# Patient Record
Sex: MALE | Race: BLACK OR AFRICAN AMERICAN | Employment: UNEMPLOYED | URBAN - METROPOLITAN AREA
[De-identification: names, ages, dates, MRNs, and addresses within clinical notes are randomized per-mention and may not be internally consistent; named-entity substitution may affect disease eponyms.]

---

## 2023-09-09 ENCOUNTER — HOSPITAL ENCOUNTER (INPATIENT)
Facility: HOSPITAL | Age: 23
LOS: 2 days | Discharge: HOME | DRG: 751 | End: 2023-09-11
Attending: STUDENT IN AN ORGANIZED HEALTH CARE EDUCATION/TRAINING PROGRAM | Admitting: PSYCHIATRY & NEUROLOGY
Payer: MEDICAID

## 2023-09-09 DIAGNOSIS — F33.2 SEVERE EPISODE OF RECURRENT MAJOR DEPRESSIVE DISORDER, WITHOUT PSYCHOTIC FEATURES (HCC): Primary | ICD-10-CM

## 2023-09-09 DIAGNOSIS — R45.851 SUICIDAL IDEATION: ICD-10-CM

## 2023-09-09 PROBLEM — F32.A DEPRESSION: Status: ACTIVE | Noted: 2023-09-09

## 2023-09-09 LAB
ALBUMIN SERPL-MCNC: 4 G/DL (ref 3.4–5)
ALBUMIN/GLOB SERPL: 1 (ref 0.8–1.7)
ALP SERPL-CCNC: 62 U/L (ref 45–117)
ALT SERPL-CCNC: 103 U/L (ref 16–61)
AMPHET UR QL SCN: NEGATIVE
ANION GAP SERPL CALC-SCNC: 6 MMOL/L (ref 3–18)
AST SERPL-CCNC: 50 U/L (ref 10–38)
BARBITURATES UR QL SCN: NEGATIVE
BASOPHILS # BLD: 0 K/UL (ref 0–0.1)
BASOPHILS NFR BLD: 0 % (ref 0–2)
BENZODIAZ UR QL: NEGATIVE
BILIRUB SERPL-MCNC: 0.6 MG/DL (ref 0.2–1)
BUN SERPL-MCNC: 11 MG/DL (ref 7–18)
BUN/CREAT SERPL: 11 (ref 12–20)
CALCIUM SERPL-MCNC: 9.3 MG/DL (ref 8.5–10.1)
CANNABINOIDS UR QL SCN: POSITIVE
CHLORIDE SERPL-SCNC: 108 MMOL/L (ref 100–111)
CO2 SERPL-SCNC: 27 MMOL/L (ref 21–32)
COCAINE UR QL SCN: NEGATIVE
CREAT SERPL-MCNC: 0.98 MG/DL (ref 0.6–1.3)
DIFFERENTIAL METHOD BLD: ABNORMAL
EOSINOPHIL # BLD: 0 K/UL (ref 0–0.4)
EOSINOPHIL NFR BLD: 1 % (ref 0–5)
ERYTHROCYTE [DISTWIDTH] IN BLOOD BY AUTOMATED COUNT: 14.6 % (ref 11.6–14.5)
ETHANOL SERPL-MCNC: <3 MG/DL (ref 0–3)
GLOBULIN SER CALC-MCNC: 3.9 G/DL (ref 2–4)
GLUCOSE SERPL-MCNC: 92 MG/DL (ref 74–99)
HCT VFR BLD AUTO: 44.8 % (ref 36–48)
HGB BLD-MCNC: 14.6 G/DL (ref 13–16)
IMM GRANULOCYTES # BLD AUTO: 0 K/UL (ref 0–0.04)
IMM GRANULOCYTES NFR BLD AUTO: 0 % (ref 0–0.5)
LYMPHOCYTES # BLD: 2.7 K/UL (ref 0.9–3.6)
LYMPHOCYTES NFR BLD: 32 % (ref 21–52)
Lab: ABNORMAL
MCH RBC QN AUTO: 29.2 PG (ref 24–34)
MCHC RBC AUTO-ENTMCNC: 32.6 G/DL (ref 31–37)
MCV RBC AUTO: 89.6 FL (ref 78–100)
METHADONE UR QL: NEGATIVE
MONOCYTES # BLD: 0.6 K/UL (ref 0.05–1.2)
MONOCYTES NFR BLD: 7 % (ref 3–10)
NEUTS SEG # BLD: 5.2 K/UL (ref 1.8–8)
NEUTS SEG NFR BLD: 60 % (ref 40–73)
NRBC # BLD: 0 K/UL (ref 0–0.01)
NRBC BLD-RTO: 0 PER 100 WBC
OPIATES UR QL: NEGATIVE
PCP UR QL: NEGATIVE
PLATELET # BLD AUTO: 194 K/UL (ref 135–420)
PMV BLD AUTO: 12 FL (ref 9.2–11.8)
POTASSIUM SERPL-SCNC: 3.6 MMOL/L (ref 3.5–5.5)
PROT SERPL-MCNC: 7.9 G/DL (ref 6.4–8.2)
RBC # BLD AUTO: 5 M/UL (ref 4.35–5.65)
SODIUM SERPL-SCNC: 141 MMOL/L (ref 136–145)
WBC # BLD AUTO: 8.5 K/UL (ref 4.6–13.2)

## 2023-09-09 PROCEDURE — 6370000000 HC RX 637 (ALT 250 FOR IP): Performed by: EMERGENCY MEDICINE

## 2023-09-09 PROCEDURE — 80053 COMPREHEN METABOLIC PANEL: CPT

## 2023-09-09 PROCEDURE — 82077 ASSAY SPEC XCP UR&BREATH IA: CPT

## 2023-09-09 PROCEDURE — 99285 EMERGENCY DEPT VISIT HI MDM: CPT

## 2023-09-09 PROCEDURE — 1240000000 HC EMOTIONAL WELLNESS R&B

## 2023-09-09 PROCEDURE — 80307 DRUG TEST PRSMV CHEM ANLYZR: CPT

## 2023-09-09 PROCEDURE — 85025 COMPLETE CBC W/AUTO DIFF WBC: CPT

## 2023-09-09 RX ORDER — ACETAMINOPHEN 325 MG/1
650 TABLET ORAL EVERY 4 HOURS PRN
Status: DISCONTINUED | OUTPATIENT
Start: 2023-09-09 | End: 2023-09-11 | Stop reason: HOSPADM

## 2023-09-09 RX ORDER — ACETAMINOPHEN 325 MG/1
650 TABLET ORAL
Status: COMPLETED | OUTPATIENT
Start: 2023-09-09 | End: 2023-09-09

## 2023-09-09 RX ORDER — POLYETHYLENE GLYCOL 3350 17 G/17G
17 POWDER, FOR SOLUTION ORAL DAILY PRN
Status: DISCONTINUED | OUTPATIENT
Start: 2023-09-09 | End: 2023-09-11 | Stop reason: HOSPADM

## 2023-09-09 RX ORDER — FLUOXETINE 10 MG/1
10 CAPSULE ORAL DAILY
Status: ON HOLD | COMMUNITY
End: 2023-09-11 | Stop reason: HOSPADM

## 2023-09-09 RX ORDER — HYDROXYZINE HYDROCHLORIDE 10 MG/1
10 TABLET, FILM COATED ORAL 3 TIMES DAILY PRN
Status: ON HOLD | COMMUNITY
End: 2023-09-11 | Stop reason: HOSPADM

## 2023-09-09 RX ORDER — ZOLPIDEM TARTRATE 5 MG/1
5 TABLET ORAL NIGHTLY PRN
Status: ON HOLD | COMMUNITY
End: 2023-09-11 | Stop reason: HOSPADM

## 2023-09-09 RX ADMIN — ACETAMINOPHEN 325MG 650 MG: 325 TABLET ORAL at 08:29

## 2023-09-09 ASSESSMENT — PATIENT HEALTH QUESTIONNAIRE - PHQ9
SUM OF ALL RESPONSES TO PHQ QUESTIONS 1-9: 10
8. MOVING OR SPEAKING SO SLOWLY THAT OTHER PEOPLE COULD HAVE NOTICED. OR THE OPPOSITE, BEING SO FIGETY OR RESTLESS THAT YOU HAVE BEEN MOVING AROUND A LOT MORE THAN USUAL: 0
2. FEELING DOWN, DEPRESSED OR HOPELESS: 2
SUM OF ALL RESPONSES TO PHQ QUESTIONS 1-9: 10
1. LITTLE INTEREST OR PLEASURE IN DOING THINGS: 2
5. POOR APPETITE OR OVEREATING: 1
3. TROUBLE FALLING OR STAYING ASLEEP: 3
10. IF YOU CHECKED OFF ANY PROBLEMS, HOW DIFFICULT HAVE THESE PROBLEMS MADE IT FOR YOU TO DO YOUR WORK, TAKE CARE OF THINGS AT HOME, OR GET ALONG WITH OTHER PEOPLE: 0
SUM OF ALL RESPONSES TO PHQ9 QUESTIONS 1 & 2: 4
7. TROUBLE CONCENTRATING ON THINGS, SUCH AS READING THE NEWSPAPER OR WATCHING TELEVISION: 0
9. THOUGHTS THAT YOU WOULD BE BETTER OFF DEAD, OR OF HURTING YOURSELF: 1
SUM OF ALL RESPONSES TO PHQ QUESTIONS 1-9: 9
SUM OF ALL RESPONSES TO PHQ QUESTIONS 1-9: 10
4. FEELING TIRED OR HAVING LITTLE ENERGY: 1

## 2023-09-09 ASSESSMENT — SLEEP AND FATIGUE QUESTIONNAIRES
DO YOU USE A SLEEP AID: NO
SLEEP PATTERN: DIFFICULTY FALLING ASLEEP;DISTURBED/INTERRUPTED SLEEP;INSOMNIA
DO YOU HAVE DIFFICULTY SLEEPING: YES
AVERAGE NUMBER OF SLEEP HOURS: 3

## 2023-09-09 ASSESSMENT — ENCOUNTER SYMPTOMS
CHEST TIGHTNESS: 0
ABDOMINAL DISTENTION: 0
BACK PAIN: 0
BLOOD IN STOOL: 0
CONSTIPATION: 0
ABDOMINAL PAIN: 0
SHORTNESS OF BREATH: 0
FACIAL SWELLING: 0
DIARRHEA: 0
EYE PAIN: 0

## 2023-09-09 ASSESSMENT — LIFESTYLE VARIABLES
HOW OFTEN DO YOU HAVE A DRINK CONTAINING ALCOHOL: NEVER
HOW MANY STANDARD DRINKS CONTAINING ALCOHOL DO YOU HAVE ON A TYPICAL DAY: PATIENT DOES NOT DRINK

## 2023-09-09 NOTE — ED PROVIDER NOTES
EMERGENCY DEPARTMENT HISTORY AND PHYSICAL EXAM    5:32 PM      Date: 9/9/2023  Patient Name: Lesly Harmon    History of Presenting Illness     Chief Complaint   Patient presents with    Suicidal       History From: Patient  HPI  62-year-old male with history of depression who presents with suicidal ideations. Patient states that he has been taking medications for insomnia, there has been increase in his medications which has read his thoughts of suicidal ideations and intent. Patient has access to a firearm. States that he called the crisis hotline which prompted him to come to the hospital today. Has no homicidal ideations, visual, auditory hallucinations. When assessing ROS he denies any nausea, vomiting, chest pain, shortness of breath, or any other changes. Nursing Notes were all reviewed and agreed with or any disagreements were addressed in the HPI. PCP: No primary care provider on file. Current Facility-Administered Medications   Medication Dose Route Frequency Provider Last Rate Last Admin    acetaminophen (TYLENOL) tablet 650 mg  650 mg Oral Q4H PRN Astrid Jackson MD        polyethylene glycol (GLYCOLAX) packet 17 g  17 g Oral Daily PRN Astrid Jackson MD           Past History     Past Medical History:  History reviewed. No pertinent past medical history. Past Surgical History:  No past surgical history on file. Family History:  No family history on file. Social History:  Social History     Tobacco Use    Smoking status: Every Day     Types: Cigarettes   Vaping Use    Vaping Use: Every day    Substances: Nicotine       Allergies:  No Known Allergies      Review of Systems       Review of Systems   Constitutional:  Negative for activity change, appetite change and fever. HENT:  Negative for ear pain and facial swelling. Eyes:  Negative for pain. Respiratory:  Negative for chest tightness and shortness of breath. Cardiovascular:  Negative for chest pain and leg swelling.

## 2023-09-09 NOTE — ED NOTES
Pt to Ed reports SI w/plan and intent. Pt reports called suicide hotline because he felt like he wasn't safe to be alone. Pt reports plan was to go home get his gun write a letter for a few of his family members and leave the house after he got off work. Pt has access to handgun pt is a .        Aleida Colmenares RN  09/09/23 6230

## 2023-09-09 NOTE — GROUP NOTE
Group Therapy Note    Date: 9/9/2023    Group Start Time: 1930  Group End Time: 1945  Group Topic: Nursing    [facility] 1 ADULT CHEM DEP    Leesa Jacobsen RN        Group Therapy Note    Attendees: 3         Notes:  Pt. Decline to join the group.                 Signature:  Leesa Jacobsen RN

## 2023-09-09 NOTE — ED NOTES
PATIENT HAD REVIEW WITH CRISIS SPECIALIST, MOTHER PRESENT AT THIS TIME     Jeff Dueñas RN  09/09/23 5536

## 2023-09-09 NOTE — ED NOTES
PATIENT RELATIVE EXPRESS WANTING TO SPEAK WITH CRISIS AGAIN PENDING REVIEW, CRISIS UPDATED ON SAME. RELATIVE LEFT FOR ERRANDS REQUESTING TO BE PRESENT WHEN PATIENT HAVING REVIEW WITH CRISIS.       Teo Cavanaugh RN  09/09/23 6043

## 2023-09-09 NOTE — ED NOTES
HAND OVER REPORT GIVEN TO ACCEPTING RN ON UNIT. NO QUESTIONS AT THIS TIME. PATIENT UPDATED.       Francie Phelan RN  09/09/23 6052

## 2023-09-09 NOTE — ED NOTES
PATIENT IN ATTENDANCE WITH RELATIVE AT THIS TIME.      PATIENT GIVEN DIET     Luci Davila RN  09/09/23 2280
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
## 2023-09-09 NOTE — ED NOTES
PATIENT AND RELATIVE UPDATED, THAT PATIENT WILL NOT BE ALLOWED WITH PHONE AND RELATIVE WILL NOT BE ABLE TO VISIT ONCE PATIENT GOES UP FOR ADMISSION.       Eriberto Valdes RN  09/09/23 2421

## 2023-09-09 NOTE — ED NOTES
RECEIVE PATIENT SITTING IN BED,COMPLAINT OF HEADACHE AT THIS TIME DUE TO THE FACT THAT HE HAS NOT SLEPT FOR THE PASS 24HRS. WHEN ENCOURAGE TO SLEEP PATIENT VERBALIZED FEELING MORE COMFORTABLE SLEEP IN AT HIS HOME, PATIENT IN NIL RESPIRATORY DISTRESS. PATIENT ALERT AND ORIENTED. PATIENT BREATHES FREELY ON ROOM AIR, CHEST EXPANSION EQUAL AND ADEQUATE. NAD TO ABDOMEN. MOVEMENT AND SENSATION PRESENT TO ALL EXTREMITIES. MOOD \"I AM FINE\" AFFECT FLAT. PATIENT VERBALIZE THAT SUICIDAL THOUGHTS NOT THAT BAD ANYMORE, PATIENT THINKS THAT HE JUST NEED TO HAVE MEDICATION CHANGED OR ADJUSTED, AS THE THOUGHTS STARTED WHEN HE WAS PUT ON MEDICATION TO HELP WITH INSOMNIA. PATIENT REQUESTED TO HAVE HIS PHONE RETURNED SAME GIVEN AT THIS TIME.  PATIENT AWAITS CRISIS TEAM AND UPDATED      Courtney Lopez RN  09/09/23 1461

## 2023-09-09 NOTE — ED TRIAGE NOTES
Pt states he called the [employer] hotline because he feels like he is a danger to himself. Pt states it got to the point today he was going to write his letter.  Go home grab his gun and leave the house

## 2023-09-09 NOTE — ED NOTES
RELATIVE (MOTHER) EXPRESS THOUGHT OF HAVING PATIENT BEING DISCHARGE TO HER UNDER HER SUPERVISION, CRISIS SPECIALIST MADE AWARE AND HAD CONVERSATION WITH RELATIVE.       Pro Simmons, YANELI  09/09/23 4943

## 2023-09-09 NOTE — ED NOTES
Pt resting NAD, pt awaiting Crisis assessment. Pt denies pain .       Mack Burgess RN  09/09/23 0784

## 2023-09-09 NOTE — ED NOTES
PATIENT MEDICATED AS PER MAR WITH TYLENOL 625MG PO, PATIENT INFORMED OF EFFECTS OF MEDICATION. PATIENT GIVEN GINGER ALE .        Josue Coburn RN  09/09/23 4058

## 2023-09-10 PROCEDURE — 6370000000 HC RX 637 (ALT 250 FOR IP): Performed by: PSYCHIATRY & NEUROLOGY

## 2023-09-10 PROCEDURE — 1240000000 HC EMOTIONAL WELLNESS R&B

## 2023-09-10 PROCEDURE — 99223 1ST HOSP IP/OBS HIGH 75: CPT | Performed by: PSYCHIATRY & NEUROLOGY

## 2023-09-10 RX ORDER — GABAPENTIN 100 MG/1
200 CAPSULE ORAL NIGHTLY
Status: DISCONTINUED | OUTPATIENT
Start: 2023-09-10 | End: 2023-09-11 | Stop reason: HOSPADM

## 2023-09-10 RX ORDER — ARIPIPRAZOLE 5 MG/1
5 TABLET ORAL DAILY
Status: DISCONTINUED | OUTPATIENT
Start: 2023-09-10 | End: 2023-09-11 | Stop reason: HOSPADM

## 2023-09-10 RX ADMIN — GABAPENTIN 200 MG: 100 CAPSULE ORAL at 21:18

## 2023-09-10 RX ADMIN — ARIPIPRAZOLE 5 MG: 5 TABLET ORAL at 10:01

## 2023-09-10 NOTE — PLAN OF CARE
Problem: Self Harm/Suicidality  Goal: Will have no self-injury during hospital stay  Description: INTERVENTIONS:  1. Ensure constant observer at bedside with Q15M safety checks  2. Maintain a safe environment  3. Secure patient belongings  4. Ensure family/visitors adhere to safety recommendations  5. Ensure safety tray has been added to patient's diet order  6. Every shift and PRN: Re-assess suicidal risk via Frequent Screener    Flowsheets (Taken 9/9/2023 2006)  Will have no self-injury during hospital stay:   Ensure constant observer at bedside with Q15M safety checks   Ensure family/visitors adhere to safety recommendations   Every shift and PRN: Re-assess suicidal risk via Frequent Screener   Pt. Is isolative to his room. He is pleasant and complaint. He offers no complaint. He is free from falls, self harm or harming others. Will continue to monitor for safety and provide support as needed.
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
## 2023-09-10 NOTE — PROGRESS NOTES
Pt. Is pleasant and complaint. He offers no complaint. He is free from falls, self harm or harming others. Will continue to monitor for safety and provide support as needed.

## 2023-09-10 NOTE — BH NOTE
PT appeared to have slept 10.75 hrs. PT slept through majority of shift and waking early. No disruptions were no disruptions were noted. Will  continue to monitor for safety and changes in behavior.

## 2023-09-10 NOTE — PLAN OF CARE
Problem: Self Harm/Suicidality  Goal: Will have no self-injury during hospital stay  Description: INTERVENTIONS:  1. Ensure constant observer at bedside with Q15M safety checks  2. Maintain a safe environment  3. Secure patient belongings  4. Ensure family/visitors adhere to safety recommendations  5. Ensure safety tray has been added to patient's diet order  6. Every shift and PRN: Re-assess suicidal risk via Frequent Screener    Outcome: Progressing    Pt in day area interacting well with peers and staff. Pt denies current thoughts of SI. Pt voices motivation for treatment in order to return to baseline so he can return to work and going to the gym. Pt is pleasant and cooperative with staff. Pt compliant with meals and meds. Rounds maintained Q 15 mins. Staff will continue to offer a safe and supportive environment.

## 2023-09-11 VITALS
TEMPERATURE: 98.3 F | OXYGEN SATURATION: 97 % | HEIGHT: 76 IN | WEIGHT: 315 LBS | SYSTOLIC BLOOD PRESSURE: 126 MMHG | RESPIRATION RATE: 18 BRPM | DIASTOLIC BLOOD PRESSURE: 66 MMHG | BODY MASS INDEX: 38.36 KG/M2 | HEART RATE: 68 BPM

## 2023-09-11 PROCEDURE — 99238 HOSP IP/OBS DSCHRG MGMT 30/<: CPT | Performed by: PSYCHIATRY & NEUROLOGY

## 2023-09-11 PROCEDURE — 6370000000 HC RX 637 (ALT 250 FOR IP): Performed by: PSYCHIATRY & NEUROLOGY

## 2023-09-11 RX ORDER — GABAPENTIN 100 MG/1
200 CAPSULE ORAL NIGHTLY
Qty: 30 CAPSULE | Refills: 1 | Status: SHIPPED | OUTPATIENT
Start: 2023-09-11 | End: 2023-10-11

## 2023-09-11 RX ORDER — ARIPIPRAZOLE 5 MG/1
5 TABLET ORAL DAILY
Qty: 15 TABLET | Refills: 1 | Status: SHIPPED | OUTPATIENT
Start: 2023-09-12

## 2023-09-11 RX ADMIN — ARIPIPRAZOLE 5 MG: 5 TABLET ORAL at 08:11

## 2023-09-11 NOTE — BH NOTE
PT appeared to have slept 7.5 hrs. Occasionally waking to toilet. PT woke early and showered , no disruptions noted. Will continue to monitor for safety and changes in behavior.

## 2023-09-11 NOTE — GROUP NOTE
Group Therapy Note    Date: 9/11/2023    Group Start Time: 0930  Group End Time: 8988  Group Topic: Recreational        Bere Greco        Group Therapy Note    Attendees: 4/13    Group type: Exercise     Recreational group engaged patients in a psychosocial intervention such as physical activity. Recreational therapist lead group members in guided exercises. Patients discussed different workouts and how to incorporate exercise into their daily routines. The group may help reduce anxiety, depression, and stress and improve self-esteem and mood. Notes:  Patient actively engaged in group, PT supported peers with modifications to exercise different exercises. Status After Intervention:  Unchanged    Participation Level:  Active Listener and Interactive    Participation Quality: Appropriate, Attentive, and Sharing    Speech:  normal    Thought Process/Content: Logical      Affective Functioning: Congruent      Mood: euthymic      Level of consciousness:  Alert and Attentive      Endings: None Reported      Modes of Intervention: Activity, Movement, and Media      Recreational Therapist  Ramya Wilkins

## 2023-09-11 NOTE — GROUP NOTE
Group Therapy Note    Date: 9/10/2023    Group Start Time: 1930  Group End Time: 2000  Group Topic: Medication    [facility] 1 ADULT CHEM DEP    Brissa Ely RN        Group Therapy Note    Attendees: 3           Notes:  Pt. Did not join the group.                 Signature:  Brissa Ely RN
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
## 2023-09-11 NOTE — DISCHARGE INSTRUCTIONS
BEHAVIORAL HEALTH NURSING DISCHARGE NOTE      The following personal items collected during your admission are returned to you:   Dental Appliance:    Vision:    Hearing Aid:    Jewelry:    Clothing:    Other Valuables:    Valuables sent to safe:        PATIENT INSTRUCTIONS:            The discharge information has been reviewed with the  patient . The  patient   verbalized understanding. Patient armband laced in shredder.

## 2023-09-11 NOTE — PROGRESS NOTES
Patient received discharge instructions, verbalized understanding of follow up appt. Patient prescriptions to [facility] pharmacy on [address]. [place], [place]. All personal items given to pt.

## 2023-09-11 NOTE — PLAN OF CARE
Problem: Self Harm/Suicidality  Goal: Will have no self-injury during hospital stay  Description: INTERVENTIONS:  1. Ensure constant observer at bedside with Q15M safety checks  2. Maintain a safe environment  3. Secure patient belongings  4. Ensure family/visitors adhere to safety recommendations  5. Ensure safety tray has been added to patient's diet order  6. Every shift and PRN: Re-assess suicidal risk via Frequent Screener    Outcome: Progressing     Problem: Depression  Goal: Will be euthymic at discharge  Description: INTERVENTIONS:  1. Administer medication as ordered  2. Provide emotional support via 1:1 interaction with staff  3. Encourage involvement in milieu/groups/activities  4. Monitor for social isolation  Outcome: Progressing   Patient states he was doing well. States he wanted to speak with his dr. He was talking about leaving today because he has things he need to do. Denies suicidal thoughts, states he has an appt. With his therapist this week. Medication compliant. Voiced no further complaints.

## 2023-09-11 NOTE — DISCHARGE SUMMARY
[facility] SUMMARY    Name:  Alla Calix  MR#:   [id number]  :  2000  ACCOUNT #:  [de-identified]  ADMIT DATE:  2023  DISCHARGE DATE:  2023    IDENTIFYING DATA:  The patient is a 21-year-old  male who lives with his mother and is covered by [employer]. BASIS FOR ADMISSION:  The patient is admitted by Dr. Brigida Freitas after presentation to emergency room saying he had a history of suicidal ideas since he was a teenager. He has had intermittent treatment in which he had a worsening of depression with the death of his aunt last year. He began seeing therapist Maryann Perez at [facility] one month ago and began seeing Charlie Gottron, nurse practitioner, at [facility] and Suboxone treatment one month ago. He had been placed on Prozac and initially felt better, but within several days felt somewhat more anxious. He had a history of anxiety. He was having trouble sleeping, and she put him on Ambien last week, and within one day, he started feeling much more depressed, agitated and anxious. He had discussed this with therapist and was recommended that he come to the hospital.  He had endorsed these thoughts of suicide to the crisis workers, and subsequently, the patient was admitted voluntarily to the hospital.    Laboratory testing done in the emergency room included a normal basic metabolic panel, liver function panel with increase in  units/L and mild increase in AST 50 units/L. He had negative alcohol level, drug screen positive for cannabis, and a normal CBC. HOSPITAL COURSE:  The patient was admitted to the locked adult unit where he was afforded individual, group and milieu therapies. Physical examination in the emergency room had not noticed any significant abnormalities.   Vital signs were normal.  The patient was seen by Dr. Brigida Freitas, and the Prozac had been discontinued as the Ambien had been discontinued, and

## 2025-04-28 ENCOUNTER — OFFICE VISIT (OUTPATIENT)
Facility: CLINIC | Age: 25
End: 2025-04-28
Payer: COMMERCIAL

## 2025-04-28 VITALS
DIASTOLIC BLOOD PRESSURE: 68 MMHG | HEART RATE: 84 BPM | RESPIRATION RATE: 17 BRPM | OXYGEN SATURATION: 99 % | BODY MASS INDEX: 34.42 KG/M2 | SYSTOLIC BLOOD PRESSURE: 133 MMHG | HEIGHT: 74 IN | WEIGHT: 268.2 LBS | TEMPERATURE: 97.9 F

## 2025-04-28 DIAGNOSIS — Z11.59 ENCOUNTER FOR HEPATITIS C SCREENING TEST FOR LOW RISK PATIENT: ICD-10-CM

## 2025-04-28 DIAGNOSIS — Z13.1 SCREENING FOR DIABETES MELLITUS (DM): ICD-10-CM

## 2025-04-28 DIAGNOSIS — Z13.29 SCREENING FOR THYROID DISORDER: ICD-10-CM

## 2025-04-28 DIAGNOSIS — R79.89 ELEVATED LFTS: ICD-10-CM

## 2025-04-28 DIAGNOSIS — F33.9 RECURRENT MAJOR DEPRESSIVE DISORDER, REMISSION STATUS UNSPECIFIED: ICD-10-CM

## 2025-04-28 DIAGNOSIS — Z00.00 ANNUAL PHYSICAL EXAM: Primary | ICD-10-CM

## 2025-04-28 DIAGNOSIS — Z13.6 ENCOUNTER FOR SCREENING FOR CORONARY ARTERY DISEASE: ICD-10-CM

## 2025-04-28 DIAGNOSIS — Z11.4 SCREENING FOR HIV (HUMAN IMMUNODEFICIENCY VIRUS): ICD-10-CM

## 2025-04-28 DIAGNOSIS — Z13.0 SCREENING FOR IRON DEFICIENCY ANEMIA: ICD-10-CM

## 2025-04-28 PROCEDURE — 99204 OFFICE O/P NEW MOD 45 MIN: CPT | Performed by: INTERNAL MEDICINE

## 2025-04-28 PROCEDURE — 99385 PREV VISIT NEW AGE 18-39: CPT | Performed by: INTERNAL MEDICINE

## 2025-04-28 RX ORDER — METHOCARBAMOL 750 MG/1
750 TABLET, FILM COATED ORAL 3 TIMES DAILY
COMMUNITY
Start: 2025-03-22 | End: 2025-04-28

## 2025-04-28 RX ORDER — DICLOFENAC POTASSIUM 50 MG/1
50 TABLET, FILM COATED ORAL 2 TIMES DAILY
COMMUNITY
Start: 2025-03-22 | End: 2025-04-28

## 2025-04-28 RX ORDER — IBUPROFEN 800 MG/1
800 TABLET, FILM COATED ORAL EVERY 6 HOURS PRN
COMMUNITY
Start: 2025-04-03 | End: 2025-04-28

## 2025-04-28 RX ORDER — ARIPIPRAZOLE 15 MG/1
15 TABLET ORAL DAILY
Qty: 30 TABLET | Refills: 1 | Status: SHIPPED | OUTPATIENT
Start: 2025-04-28

## 2025-04-28 RX ORDER — ARIPIPRAZOLE 15 MG/1
15 TABLET ORAL DAILY
COMMUNITY
Start: 2025-04-15 | End: 2025-04-28 | Stop reason: SDUPTHER

## 2025-04-28 SDOH — ECONOMIC STABILITY: FOOD INSECURITY: WITHIN THE PAST 12 MONTHS, THE FOOD YOU BOUGHT JUST DIDN'T LAST AND YOU DIDN'T HAVE MONEY TO GET MORE.: NEVER TRUE

## 2025-04-28 SDOH — ECONOMIC STABILITY: FOOD INSECURITY: WITHIN THE PAST 12 MONTHS, YOU WORRIED THAT YOUR FOOD WOULD RUN OUT BEFORE YOU GOT MONEY TO BUY MORE.: NEVER TRUE

## 2025-04-28 ASSESSMENT — PATIENT HEALTH QUESTIONNAIRE - PHQ9
2. FEELING DOWN, DEPRESSED OR HOPELESS: SEVERAL DAYS
SUM OF ALL RESPONSES TO PHQ QUESTIONS 1-9: 14
1. LITTLE INTEREST OR PLEASURE IN DOING THINGS: SEVERAL DAYS
SUM OF ALL RESPONSES TO PHQ QUESTIONS 1-9: 14
5. POOR APPETITE OR OVEREATING: NEARLY EVERY DAY
3. TROUBLE FALLING OR STAYING ASLEEP: NEARLY EVERY DAY
10. IF YOU CHECKED OFF ANY PROBLEMS, HOW DIFFICULT HAVE THESE PROBLEMS MADE IT FOR YOU TO DO YOUR WORK, TAKE CARE OF THINGS AT HOME, OR GET ALONG WITH OTHER PEOPLE: SOMEWHAT DIFFICULT
8. MOVING OR SPEAKING SO SLOWLY THAT OTHER PEOPLE COULD HAVE NOTICED. OR THE OPPOSITE, BEING SO FIGETY OR RESTLESS THAT YOU HAVE BEEN MOVING AROUND A LOT MORE THAN USUAL: SEVERAL DAYS
6. FEELING BAD ABOUT YOURSELF - OR THAT YOU ARE A FAILURE OR HAVE LET YOURSELF OR YOUR FAMILY DOWN: SEVERAL DAYS
7. TROUBLE CONCENTRATING ON THINGS, SUCH AS READING THE NEWSPAPER OR WATCHING TELEVISION: SEVERAL DAYS
4. FEELING TIRED OR HAVING LITTLE ENERGY: NEARLY EVERY DAY
9. THOUGHTS THAT YOU WOULD BE BETTER OFF DEAD, OR OF HURTING YOURSELF: NOT AT ALL

## 2025-04-28 ASSESSMENT — ENCOUNTER SYMPTOMS
CONSTIPATION: 0
DIARRHEA: 0
COUGH: 0
SHORTNESS OF BREATH: 0
ABDOMINAL PAIN: 0
BLOOD IN STOOL: 0

## 2025-04-28 NOTE — PATIENT INSTRUCTIONS
Patient Education        Learning About Benefits of Quitting Smoking  Quitting smoking helps your body in many ways. Quitting lowers your risk for cancer, lung disease, heart attack, stroke, blood vessel disease, and blindness. You'll also get sick less often and heal faster. And after you quit, you may find that your mood is better and you are less stressed.  When and how will you feel healthier after quitting smoking?        In the first hours or days:   Your blood pressure and heart rate go down.  Your oxygen levels increase.        Within weeks or months:   You will cough less and breathe deeper. It may be easier to be active.  Your sense of taste and smell should return.        Over the years:   Your risks of heart disease, heart attack, and stroke are lower.  Your risk of lung cancer is cut by about half after about 10 years. And your risk for other cancers is lower too.  How would quitting help others in your life?    Their heart, lung, and cancer risks may drop, much like yours. They will also be sick less.   If you are or will be pregnant someday, quitting smoking means a healthier .   Where can you learn more?  Go to https://www.[employer].net/patientEd and enter O319 to learn more about \"Learning About Benefits of Quitting Smoking.\"  Current as of: 2024  Content Version: 14.4  © 4828-2317 [employer].   Care instructions adapted under license by [employer]. If you have questions about a medical condition or this instruction, always ask your healthcare professional. [employer], disclaims any warranty or liability for your use of this information.     [facility] Transportation Resources*  (Call [employer]/[facility] if need more resources.)    [facility]  What they offer: [facility] [employer] offers fixed routes, medical transportation, and on-demand response transit for those age 60+.  Accepts

## 2025-04-28 NOTE — PROGRESS NOTES
constipation and diarrhea.   Genitourinary:  Negative for difficulty urinating.   Neurological:  Negative for headaches.       Objective:   Visit Vitals  /68 (BP Cuff Size: Large Adult)   Pulse 84   Temp 97.9 °F (36.6 °C) (Temporal)   Resp 17   Ht 1.88 m (6' 2\")   Wt 121.7 kg (268 lb 3.2 oz)   SpO2 99%   BMI 34.43 kg/m²        Physical Exam  Constitutional:       Appearance: Normal appearance.   HENT:      Right Ear: Hearing, tympanic membrane, ear canal and external ear normal.      Left Ear: Hearing, tympanic membrane, ear canal and external ear normal.      Mouth/Throat:      Lips: Pink.      Mouth: Mucous membranes are moist.      Pharynx: Oropharynx is clear.   Eyes:      Pupils: Pupils are equal, round, and reactive to light.   Neck:      Thyroid: No thyroid mass or thyroid tenderness.   Cardiovascular:      Rate and Rhythm: Normal rate and regular rhythm.      Heart sounds: S1 normal and S2 normal.   Pulmonary:      Effort: Pulmonary effort is normal.      Breath sounds: Normal breath sounds.      Comments: No crackles or wheezing.  Abdominal:      General: Bowel sounds are normal.      Palpations: Abdomen is soft.      Hernia: There is no hernia in the left inguinal area or right inguinal area.   Genitourinary:     Penis: Normal.       Testes: Normal.         Right: Mass, tenderness or swelling not present.         Left: Mass, tenderness or swelling not present.      Epididymis:      Right: Normal.      Left: Normal.   Musculoskeletal:         General: Normal range of motion.      Cervical back: Normal range of motion.      Right lower leg: No edema.      Left lower leg: No edema.   Lymphadenopathy:      Head:      Right side of head: No submental, submandibular, tonsillar, preauricular, posterior auricular or occipital adenopathy.      Left side of head: No submental, submandibular, tonsillar, preauricular, posterior auricular or occipital adenopathy.      Cervical: No cervical adenopathy.      Right

## 2025-04-29 LAB
ALBUMIN SERPL-MCNC: 4.3 G/DL (ref 4.3–5.2)
ALP SERPL-CCNC: 66 IU/L (ref 44–121)
ALT SERPL-CCNC: 24 IU/L (ref 0–44)
AST SERPL-CCNC: 30 IU/L (ref 0–40)
BASOPHILS # BLD AUTO: 0 X10E3/UL (ref 0–0.2)
BASOPHILS NFR BLD AUTO: 0 %
BILIRUB SERPL-MCNC: 0.4 MG/DL (ref 0–1.2)
BUN SERPL-MCNC: 12 MG/DL (ref 6–20)
BUN/CREAT SERPL: 14 (ref 9–20)
CALCIUM SERPL-MCNC: 9.4 MG/DL (ref 8.7–10.2)
CHLORIDE SERPL-SCNC: 104 MMOL/L (ref 96–106)
CHOLEST SERPL-MCNC: 124 MG/DL (ref 100–199)
CO2 SERPL-SCNC: 22 MMOL/L (ref 20–29)
CREAT SERPL-MCNC: 0.86 MG/DL (ref 0.76–1.27)
EGFRCR SERPLBLD CKD-EPI 2021: 123 ML/MIN/1.73
EOSINOPHIL # BLD AUTO: 0.1 X10E3/UL (ref 0–0.4)
EOSINOPHIL NFR BLD AUTO: 1 %
ERYTHROCYTE [DISTWIDTH] IN BLOOD BY AUTOMATED COUNT: 13.8 % (ref 11.6–15.4)
GLOBULIN SER CALC-MCNC: 2.6 G/DL (ref 1.5–4.5)
GLUCOSE SERPL-MCNC: 102 MG/DL (ref 70–99)
HBA1C MFR BLD: 5.4 % (ref 4.8–5.6)
HCT VFR BLD AUTO: 37.5 % (ref 37.5–51)
HDLC SERPL-MCNC: 51 MG/DL
HGB BLD-MCNC: 12.5 G/DL (ref 13–17.7)
IMM GRANULOCYTES # BLD AUTO: 0 X10E3/UL (ref 0–0.1)
IMM GRANULOCYTES NFR BLD AUTO: 0 %
LDLC SERPL CALC-MCNC: 62 MG/DL (ref 0–99)
LYMPHOCYTES # BLD AUTO: 1.7 X10E3/UL (ref 0.7–3.1)
LYMPHOCYTES NFR BLD AUTO: 24 %
MCH RBC QN AUTO: 30.3 PG (ref 26.6–33)
MCHC RBC AUTO-ENTMCNC: 33.3 G/DL (ref 31.5–35.7)
MCV RBC AUTO: 91 FL (ref 79–97)
MONOCYTES # BLD AUTO: 0.7 X10E3/UL (ref 0.1–0.9)
MONOCYTES NFR BLD AUTO: 10 %
NEUTROPHILS # BLD AUTO: 4.7 X10E3/UL (ref 1.4–7)
NEUTROPHILS NFR BLD AUTO: 65 %
PLATELET # BLD AUTO: 191 X10E3/UL (ref 150–450)
POTASSIUM SERPL-SCNC: 3.6 MMOL/L (ref 3.5–5.2)
PROT SERPL-MCNC: 6.9 G/DL (ref 6–8.5)
RBC # BLD AUTO: 4.13 X10E6/UL (ref 4.14–5.8)
SODIUM SERPL-SCNC: 142 MMOL/L (ref 134–144)
T4 FREE SERPL-MCNC: 1.2 NG/DL (ref 0.82–1.77)
TRIGL SERPL-MCNC: 47 MG/DL (ref 0–149)
TSH SERPL DL<=0.005 MIU/L-ACNC: 0.87 UIU/ML (ref 0.45–4.5)
VLDLC SERPL CALC-MCNC: 11 MG/DL (ref 5–40)
WBC # BLD AUTO: 7.2 X10E3/UL (ref 3.4–10.8)

## 2025-04-30 LAB
HCV AB SERPL QL IA: NON REACTIVE
HCV AB SERPL QL IA: NORMAL
HIV 1+2 AB+HIV1 P24 AG SERPL QL IA: NON REACTIVE

## 2025-05-05 ENCOUNTER — RESULTS FOLLOW-UP (OUTPATIENT)
Facility: CLINIC | Age: 25
End: 2025-05-05

## 2025-05-13 ENCOUNTER — OFFICE VISIT (OUTPATIENT)
Facility: CLINIC | Age: 25
End: 2025-05-13
Payer: COMMERCIAL

## 2025-05-13 VITALS
HEIGHT: 74 IN | WEIGHT: 270.2 LBS | DIASTOLIC BLOOD PRESSURE: 75 MMHG | TEMPERATURE: 97.2 F | BODY MASS INDEX: 34.68 KG/M2 | HEART RATE: 71 BPM | RESPIRATION RATE: 17 BRPM | OXYGEN SATURATION: 97 % | SYSTOLIC BLOOD PRESSURE: 117 MMHG

## 2025-05-13 DIAGNOSIS — D64.9 ANEMIA, UNSPECIFIED TYPE: ICD-10-CM

## 2025-05-13 DIAGNOSIS — M54.31 RIGHT SCIATIC NERVE PAIN: Primary | ICD-10-CM

## 2025-05-13 PROCEDURE — 99214 OFFICE O/P EST MOD 30 MIN: CPT | Performed by: INTERNAL MEDICINE

## 2025-05-13 RX ORDER — PREDNISONE 20 MG/1
20 TABLET ORAL 4 TIMES DAILY
Refills: 0 | Status: CANCELLED | OUTPATIENT
Start: 2025-05-13

## 2025-05-13 RX ORDER — METHYLPREDNISOLONE 4 MG/1
TABLET ORAL
Qty: 1 KIT | Refills: 0 | Status: SHIPPED | OUTPATIENT
Start: 2025-05-13 | End: 2025-05-19

## 2025-05-13 RX ORDER — PREDNISONE 20 MG/1
20 TABLET ORAL DAILY
COMMUNITY
Start: 2025-03-24

## 2025-05-13 RX ORDER — DICLOFENAC POTASSIUM 50 MG/1
50 TABLET, FILM COATED ORAL 2 TIMES DAILY
Qty: 60 TABLET | Refills: 1 | Status: SHIPPED | OUTPATIENT
Start: 2025-05-13

## 2025-05-13 RX ORDER — DICLOFENAC POTASSIUM 50 MG/1
50 TABLET, FILM COATED ORAL 3 TIMES DAILY
COMMUNITY
End: 2025-05-13 | Stop reason: SDUPTHER

## 2025-05-13 ASSESSMENT — PATIENT HEALTH QUESTIONNAIRE - PHQ9
9. THOUGHTS THAT YOU WOULD BE BETTER OFF DEAD, OR OF HURTING YOURSELF: NOT AT ALL
2. FEELING DOWN, DEPRESSED OR HOPELESS: SEVERAL DAYS
3. TROUBLE FALLING OR STAYING ASLEEP: NEARLY EVERY DAY
SUM OF ALL RESPONSES TO PHQ QUESTIONS 1-9: 12
6. FEELING BAD ABOUT YOURSELF - OR THAT YOU ARE A FAILURE OR HAVE LET YOURSELF OR YOUR FAMILY DOWN: SEVERAL DAYS
1. LITTLE INTEREST OR PLEASURE IN DOING THINGS: SEVERAL DAYS
SUM OF ALL RESPONSES TO PHQ QUESTIONS 1-9: 12
8. MOVING OR SPEAKING SO SLOWLY THAT OTHER PEOPLE COULD HAVE NOTICED. OR THE OPPOSITE, BEING SO FIGETY OR RESTLESS THAT YOU HAVE BEEN MOVING AROUND A LOT MORE THAN USUAL: SEVERAL DAYS
10. IF YOU CHECKED OFF ANY PROBLEMS, HOW DIFFICULT HAVE THESE PROBLEMS MADE IT FOR YOU TO DO YOUR WORK, TAKE CARE OF THINGS AT HOME, OR GET ALONG WITH OTHER PEOPLE: SOMEWHAT DIFFICULT
4. FEELING TIRED OR HAVING LITTLE ENERGY: NEARLY EVERY DAY
5. POOR APPETITE OR OVEREATING: SEVERAL DAYS
7. TROUBLE CONCENTRATING ON THINGS, SUCH AS READING THE NEWSPAPER OR WATCHING TELEVISION: SEVERAL DAYS

## 2025-05-13 ASSESSMENT — ENCOUNTER SYMPTOMS
ABDOMINAL PAIN: 0
SHORTNESS OF BREATH: 0

## 2025-05-13 NOTE — PROGRESS NOTES
Subjective:      Patient ID: Kalyan Gomez is a 24 y.o. male.    Follow up    He did not tolerate Abilify for feeling irritated when using it.  He stopped taking it.  He has an appointment [facility].  Will complete workup for mild anemia today.  PHQ-9 Total Score: 12 (5/13/2025 12:03 PM)  Thoughts that you would be better off dead, or of hurting yourself in some way: 0 (5/13/2025 12:03 PM)  Patient complaining of right sciatic pain.  In the past it improved with prednisone and NSAIDs as needed.  No red flags at this time.  Will try methylprednisolone Dosepak and diclofenac twice a day with meals and water as needed for pain.  No other complaints or concerns.          Major depressive disorder  PHQ-9 Total Score: 14 (4/28/2025  2:47 PM)  Thoughts that you would be better off dead, or of hurting yourself in some way: 0 (4/28/2025  2:47 PM)  PHQ-9 Total Score: 12 (5/13/2025 12:03 PM)  Thoughts that you would be better off dead, or of hurting yourself in some way: 0 (5/13/2025 12:03 PM)ARIPiprazole (ABILIFY) 15 MG tablet, daily. (Re-started 04/28/2025)  He did not tolerate Abilify for feeling irritated when using it.  He stopped taking it.  Appointment with [facility] on 5/14/2025, to establish care.  [facility] psychiatrist    Right sciatic pain  Medrol Dosepak as needed.  Diclofenac daily as needed.      PAST MEDICAL HISTORY  Medical: as listed.  Surgical: denied.  Allergies: denied.  Medication: as listed.  Family: denied.  Work:  at campus.  Social: tobacco denied, [place] denied, recreational drugs marijuana.  Sexual: single, no children, STDs denied.          Review of Systems   Constitutional:  Negative for chills and fever.   Respiratory:  Negative for shortness of breath.    Cardiovascular:  Negative for chest pain.   Gastrointestinal:  Negative for abdominal pain.       Objective:   Visit Vitals  /75 (BP Cuff Size: Large Adult)

## 2025-06-05 DIAGNOSIS — F33.9 RECURRENT MAJOR DEPRESSIVE DISORDER, REMISSION STATUS UNSPECIFIED: ICD-10-CM

## 2025-06-07 RX ORDER — ARIPIPRAZOLE 15 MG/1
15 TABLET ORAL DAILY
Qty: 90 TABLET | Refills: 1 | OUTPATIENT
Start: 2025-06-07